# Patient Record
Sex: FEMALE | Race: WHITE | NOT HISPANIC OR LATINO | Employment: OTHER | URBAN - METROPOLITAN AREA
[De-identification: names, ages, dates, MRNs, and addresses within clinical notes are randomized per-mention and may not be internally consistent; named-entity substitution may affect disease eponyms.]

---

## 2017-05-03 ENCOUNTER — HOSPITAL ENCOUNTER (OUTPATIENT)
Dept: MAMMOGRAPHY | Facility: CLINIC | Age: 62
Discharge: HOME | End: 2017-05-03
Attending: INTERNAL MEDICINE | Admitting: INTERNAL MEDICINE
Payer: COMMERCIAL

## 2017-05-03 DIAGNOSIS — Z12.31 VISIT FOR SCREENING MAMMOGRAM: ICD-10-CM

## 2017-05-03 PROCEDURE — G0202 SCR MAMMO BI INCL CAD: HCPCS

## 2017-12-11 ENCOUNTER — HOSPITAL ENCOUNTER (OUTPATIENT)
Dept: NUTRITION | Facility: CLINIC | Age: 62
Discharge: HOME | End: 2017-12-11
Attending: INTERNAL MEDICINE | Admitting: INTERNAL MEDICINE
Payer: COMMERCIAL

## 2017-12-11 PROCEDURE — 97802 MEDICAL NUTRITION INDIV IN: CPT | Performed by: DIETITIAN, REGISTERED

## 2017-12-12 VITALS — WEIGHT: 119 LBS | BODY MASS INDEX: 18.64 KG/M2

## 2017-12-12 NOTE — PROGRESS NOTES
"OUTPATIENT NUTRITION ASSESSMENT  REASON FOR ASSESSMENT  Reyna Ramirez referred by Dr. Baldwin for MNT related to IBS.    Patient accompanied by self      ASSESSMENT   Nutrition History: - Information obtained from patient.  Patient with 2 bowel obstructions with one bowel resection since July.  Patient wants to know foods she can eat to prevent further bowel issues.   Patient received education while in the hospital ([facility]) and was given low fiber/low residue diet instruction.  Patient has started taking Miralax daily along with Colace which has improved her constipation.  Patient brought whey protein powder for dietitian to evaluate as states was given sample in the hospital and was encouraged by pharmacist to begin taking.  Patient has a history of constipation as previously would have bowel movement every 10-12 days.  Patient now having bowel movements every 2-3 days.  Patient states has history of eating disorder which she restricted and also binged.  Patient states has not binged for the past year.      Diet Recall:  Breakfast: 1 egg and English muffin   Lunch: 2 cups almond mild, cottage cheese, grapes, tomatoes, and 2 oz turkey   Dinner: Asian chicken salad with 1 slice bread  Snack: chocolate covered peanuts; mixed nuts  Beverages: 48 oz water; almond milk   Dining out: varies         Exercise: Patient has not been exercising consistently since her surgery.  Prior to surgery, patient was walking 3 miles per day around [facility].       PHYSICAL FINDINGS  Observed:  Muscle Wasting-arms  Subcutaneous fat loss-orbital region  Obtained from Chart/Interdisciplinary Team:  None noted    LABS  N/A    MEDICATIONS  N/A    ANTHROPOMETRICS   Height: 5'7\"  Weight: 119 lbs  BMI (kg/m2): 18.6 kg/m2  Weight Status:  Normal BMI  %IBW: 88%  Weight History: Patient states usual body weight 125-130 lbs.  Patient states after surgery her weight decreased to 113 lbs.    Wt Readings from Last 5 Encounters:   12/11/17 54 kg " (119 lb)   05/26/16 59 kg (130 lb)       ASSESSED NUTRITION NEEDS  Estimated Energy Needs: 6100-7694 kcals/day (30-35 Kcal/Kg)  Justification:  (repletion)  Estimated Protein Needs: 54-65 grams protein/day (1-1.2 g pro/Kg)  Justification:  (Repletion)  Estimated Fluid Needs: 2564-4929 mL/day (25-30 mL/kg)    ASSESSED MALNUTRITION STATUS  % Weight Loss:  Up to 7.5% in 3 months (non-severe malnutrition)  % Intake:  Decreased intake does not meet criteria for malnutrition   Subcutaneous Fat Loss:  Orbital region mild depletion  Loss of Muscle Mass:  Temporal region mild depletion  Fluid Retention:  None noted    Malnutrition Diagnosis:  Non-Severe malnutrition  In the Context of:  Chronic illness or disease    DIAGNOSIS   Nutrition Diagnosis:  Inadequate energy intake related to recent bowel surgery as evidenced by 9% weight loss in 5 months and restricted eating       INTERVENTIONS   Nutrition Prescription   Recommend 5-6 small meals per day with focus on fiber and protein needs    IMPLEMENTATION   Assessed learning needs and learning preference  Teaching Method(s) used: Booklet / Handout  Explanation  Vitamin and Mineral Supplements: per MD  Diet Education:  Provided education on IBS diet (FODMAP)  Nutrition Education (Content):   a)  Discussed portion sizes, consistent eating pattern and importance of eating enough calories to sustain energy.  Also reviewed science behind low FODMAP diet for IBS; outlined steps for the elimination diet, reviewed label reading; reviewed list of acceptable foods. Patient to begin eliminated added fibers to food before starting trial of FODMAP diet.  Low FODMAP diet guidelines include: (No wheat, fructans, lactose, or beans, limited fruits and vegetables d/t high fructose content, avoidance of HFCS and sugar alcohols.)  Reviewed protein supplement and since it was whey based protein supplement, suggest could mix with milk as quick meal option.  Suggest patient begin eliminating  additives to see if it improves her constipation.  Supported patient in her struggle with food   b)  Provided FODMAP diet  Nutrition Education (Application):   a)  Discussed current eating plans and recommended ways to increase food choices while limiting FODMAP foods.   b)  Patient verbalizes understanding of diet by stating will eat small meals throughout the day.  Anticipate fair-good compliance     GOALS  Eat 5-6 small meals per day  Trial of eliminating added fibers such as chicory and inulin     FOLLOW UP/MONITORING   Progress towards goals will be monitored and evaluated per protocol and Practice Guidelines  Patient to call for follow up appointment in 4-6 weeks   RD name and number provided     Time Spent with Patient  60 minutes    Marito Duval, RD, LD  [facility] Outpatient Dietitian  [phone] (office phone)

## 2018-05-04 ENCOUNTER — HOSPITAL ENCOUNTER (OUTPATIENT)
Dept: MAMMOGRAPHY | Facility: CLINIC | Age: 63
Discharge: HOME | End: 2018-05-04
Attending: INTERNAL MEDICINE | Admitting: INTERNAL MEDICINE
Payer: COMMERCIAL

## 2018-05-04 DIAGNOSIS — Z12.31 VISIT FOR SCREENING MAMMOGRAM: ICD-10-CM

## 2018-05-04 PROCEDURE — 77063 BREAST TOMOSYNTHESIS BI: CPT

## 2018-12-27 ENCOUNTER — ALLIED HEALTH/NURSE VISIT (OUTPATIENT)
Dept: NURSING | Facility: CLINIC | Age: 63
End: 2018-12-27
Payer: COMMERCIAL

## 2018-12-27 DIAGNOSIS — Z23 NEED FOR ZOSTER VACCINATION: Primary | ICD-10-CM

## 2018-12-27 PROCEDURE — 90750 HZV VACC RECOMBINANT IM: CPT

## 2018-12-27 PROCEDURE — 90471 IMMUNIZATION ADMIN: CPT

## 2019-06-18 ENCOUNTER — HOSPITAL ENCOUNTER (OUTPATIENT)
Dept: MAMMOGRAPHY | Facility: CLINIC | Age: 64
Discharge: HOME | End: 2019-06-18
Attending: INTERNAL MEDICINE | Admitting: INTERNAL MEDICINE
Payer: COMMERCIAL

## 2019-06-18 DIAGNOSIS — Z12.31 OTHER SCREENING MAMMOGRAM: ICD-10-CM

## 2019-06-18 PROCEDURE — 77063 BREAST TOMOSYNTHESIS BI: CPT

## 2020-10-20 ENCOUNTER — HOSPITAL ENCOUNTER (OUTPATIENT)
Dept: MAMMOGRAPHY | Facility: CLINIC | Age: 65
Discharge: HOME | End: 2020-10-20
Attending: INTERNAL MEDICINE | Admitting: INTERNAL MEDICINE
Payer: COMMERCIAL

## 2020-10-20 DIAGNOSIS — Z12.31 VISIT FOR SCREENING MAMMOGRAM: ICD-10-CM

## 2020-10-20 PROCEDURE — 77067 SCR MAMMO BI INCL CAD: CPT

## 2021-10-22 ENCOUNTER — HOSPITAL ENCOUNTER (OUTPATIENT)
Dept: MAMMOGRAPHY | Facility: CLINIC | Age: 66
Discharge: HOME | End: 2021-10-22
Attending: INTERNAL MEDICINE | Admitting: INTERNAL MEDICINE
Payer: COMMERCIAL

## 2021-10-22 DIAGNOSIS — Z12.31 VISIT FOR SCREENING MAMMOGRAM: ICD-10-CM

## 2021-10-22 PROCEDURE — 77063 BREAST TOMOSYNTHESIS BI: CPT

## 2022-08-19 ENCOUNTER — LAB REQUISITION (OUTPATIENT)
Dept: LAB | Facility: CLINIC | Age: 67
End: 2022-08-19

## 2022-08-19 DIAGNOSIS — Z01.419 ENCOUNTER FOR GYNECOLOGICAL EXAMINATION (GENERAL) (ROUTINE) WITHOUT ABNORMAL FINDINGS: ICD-10-CM

## 2022-08-19 PROCEDURE — G0123 SCREEN CERV/VAG THIN LAYER: HCPCS | Performed by: OBSTETRICS & GYNECOLOGY

## 2022-08-19 PROCEDURE — 87624 HPV HI-RISK TYP POOLED RSLT: CPT | Performed by: OBSTETRICS & GYNECOLOGY

## 2022-08-24 LAB
BKR LAB AP GYN ADEQUACY: NORMAL
BKR LAB AP GYN INTERPRETATION: NORMAL
BKR LAB AP HPV REFLEX: NORMAL
BKR LAB AP LMP: NORMAL
BKR LAB AP PREVIOUS ABNL DX: NORMAL
BKR LAB AP PREVIOUS ABNORMAL: NORMAL
PATH REPORT.COMMENTS IMP SPEC: NORMAL
PATH REPORT.COMMENTS IMP SPEC: NORMAL
PATH REPORT.RELEVANT HX SPEC: NORMAL

## 2022-08-25 LAB
HUMAN PAPILLOMA VIRUS 16 DNA: NEGATIVE
HUMAN PAPILLOMA VIRUS 18 DNA: NEGATIVE
HUMAN PAPILLOMA VIRUS FINAL DIAGNOSIS: NORMAL
HUMAN PAPILLOMA VIRUS OTHER HR: NEGATIVE

## 2022-08-26 ENCOUNTER — APPOINTMENT (OUTPATIENT)
Dept: URBAN - METROPOLITAN AREA CLINIC 257 | Age: 67
Setting detail: DERMATOLOGY
End: 2022-08-26

## 2022-08-26 DIAGNOSIS — L85.3 XEROSIS CUTIS: ICD-10-CM

## 2022-08-26 DIAGNOSIS — L82.1 OTHER SEBORRHEIC KERATOSIS: ICD-10-CM

## 2022-08-26 DIAGNOSIS — B07.8 OTHER VIRAL WARTS: ICD-10-CM

## 2022-08-26 DIAGNOSIS — L84 CORNS AND CALLOSITIES: ICD-10-CM

## 2022-08-26 PROCEDURE — OTHER MIPS QUALITY: OTHER

## 2022-08-26 PROCEDURE — 17110 DESTRUCT B9 LESION 1-14: CPT

## 2022-08-26 PROCEDURE — 99203 OFFICE O/P NEW LOW 30 MIN: CPT | Mod: 25

## 2022-08-26 PROCEDURE — OTHER LIQUID NITROGEN: OTHER

## 2022-08-26 PROCEDURE — OTHER REASSURANCE: OTHER

## 2022-08-26 PROCEDURE — OTHER PRESCRIPTION: OTHER

## 2022-08-26 PROCEDURE — OTHER COUNSELING: OTHER

## 2022-08-26 RX ORDER — IMIQUIMOD 12.5 MG/.25G
CREAM TOPICAL
Qty: 24 | Refills: 0 | Status: CANCELLED | COMMUNITY
Start: 2022-08-26

## 2022-08-26 ASSESSMENT — LOCATION SIMPLE DESCRIPTION DERM
LOCATION SIMPLE: RIGHT PRETIBIAL REGION
LOCATION SIMPLE: RIGHT INDEX FINGER
LOCATION SIMPLE: LEFT RING FINGER
LOCATION SIMPLE: LEFT MIDDLE FINGER
LOCATION SIMPLE: LEFT PLANTAR SURFACE

## 2022-08-26 ASSESSMENT — LOCATION ZONE DERM
LOCATION ZONE: FINGER
LOCATION ZONE: LEG
LOCATION ZONE: FEET

## 2022-08-26 ASSESSMENT — LOCATION DETAILED DESCRIPTION DERM
LOCATION DETAILED: RIGHT DISTAL PRETIBIAL REGION
LOCATION DETAILED: RIGHT DISTAL ULNAR PALMAR INDEX FINGER
LOCATION DETAILED: LEFT MID DORSAL MIDDLE FINGER
LOCATION DETAILED: LEFT PLANTAR FOREFOOT OVERLYING 3RD METATARSAL
LOCATION DETAILED: LEFT MID DORSAL RING FINGER

## 2022-08-26 NOTE — PROCEDURE: LIQUID NITROGEN
Spray Paint Text: The liquid nitrogen was applied to the skin utilizing a spray paint frosting technique.
Show Spray Paint Technique Variable?: Yes
Medical Necessity Clause: This procedure was medically necessary because the lesions that were treated were:
Detail Level: Zone
Post-Care Instructions: I reviewed with the patient in detail post-care instructions. Patient is to wear sunprotection, and avoid picking at any of the treated lesions. Pt may apply Vaseline to crusted or scabbing areas.
Add 52 Modifier (Optional): no
Medical Necessity Information: It is in your best interest to select a reason for this procedure from the list below. All of these items fulfill various CMS LCD requirements except the new and changing color options.
Consent: The patient's consent was obtained including but not limited to risks of crusting, scabbing, blistering, scarring, darker or lighter pigmentary change, recurrence, incomplete removal and infection.

## 2022-08-26 NOTE — PROCEDURE: COUNSELING
Detail Level: Generalized
Detail Level: Detailed
Patient Specific Counseling (Will Not Stick From Patient To Patient): Recommended emery board or nail file

## 2022-08-26 NOTE — HPI: WARTS (VERRUCA)
How Severe Are Your Warts?: mild
Is This A New Presentation, Or A Follow-Up?: Warts
Treatment Number (Optional): 6
Additional History: Pt states she sees another dermatologist and has had these treated 5 times (last treatment of July 14) and that she does not feel that they are going away and wanted a second opinion.

## 2022-08-29 ENCOUNTER — RX ONLY (RX ONLY)
Age: 67
End: 2022-08-29

## 2022-08-29 RX ORDER — IMIQUIMOD 12.5 MG/.25G
CREAM TOPICAL
Qty: 24 | Refills: 0 | Status: ERX

## 2022-11-01 NOTE — TELEPHONE ENCOUNTER
12/15/2022-Request for images faxed to [facility] @ 1035am    12/20/2022-[facility] Images now in PACS-MR @ 432am    FUTURE VISIT INFORMATION      FUTURE VISIT INFORMATION:    Date: 12/21/2022    Time: 930am    [place] [facility] [place]FORMATION:    Referring provider:  Self     Referring providers clinic:      Reason for visit/diagnosis  Headaches     RECORDS REQUESTED FROM:       Clinic name Comments Records Status Imaging Status   [facility]-8/23/2022    [facility]-12/14/2021 Care Everywhere Requested

## 2022-11-30 ENCOUNTER — HOSPITAL ENCOUNTER (OUTPATIENT)
Dept: MAMMOGRAPHY | Facility: CLINIC | Age: 67
Discharge: HOME | End: 2022-11-30
Attending: INTERNAL MEDICINE | Admitting: INTERNAL MEDICINE
Payer: COMMERCIAL

## 2022-11-30 DIAGNOSIS — Z12.31 VISIT FOR SCREENING MAMMOGRAM: ICD-10-CM

## 2022-11-30 PROCEDURE — 77067 SCR MAMMO BI INCL CAD: CPT

## 2022-12-14 ASSESSMENT — HEADACHE IMPACT TEST (HIT 6)
WHEN YOU HAVE A HEADACHE HOW OFTEN DO YOU WISH YOU COULD LIE DOWN: VERY OFTEN
HOW OFTEN HAVE YOU FELT FED UP OR IRRITATED BECAUSE OF YOUR HEADACHES: ALWAYS
HIT6 TOTAL SCORE: 72
HOW OFTEN DO HEADACHES LIMIT YOUR DAILY ACTIVITIES: VERY OFTEN
WHEN YOU HAVE HEADACHES HOW OFTEN IS THE PAIN SEVERE: VERY OFTEN
HOW OFTEN HAVE YOU FELT TOO TIRED TO WORK BECAUSE OF YOUR HEADACHES: ALWAYS
HOW OFTEN DID HEADACHS LIMIT CONCENTRATION ON WORK OR DAILY ACTIVITY: ALWAYS

## 2022-12-21 ENCOUNTER — VIRTUAL VISIT (OUTPATIENT)
Dept: NEUROLOGY | Facility: CLINIC | Age: 67
End: 2022-12-21
Payer: COMMERCIAL

## 2022-12-21 ENCOUNTER — PRE VISIT (OUTPATIENT)
Dept: NEUROLOGY | Facility: CLINIC | Age: 67
End: 2022-12-21

## 2022-12-21 DIAGNOSIS — G43.709 CHRONIC MIGRAINE WITHOUT AURA WITHOUT STATUS MIGRAINOSUS, NOT INTRACTABLE: Primary | ICD-10-CM

## 2022-12-21 PROCEDURE — 99205 OFFICE O/P NEW HI 60 MIN: CPT | Mod: GT | Performed by: PSYCHIATRY & NEUROLOGY

## 2022-12-21 RX ORDER — MAGNESIUM 200 MG
TABLET ORAL EVERY 24 HOURS
COMMUNITY
Start: 2022-11-29

## 2022-12-21 RX ORDER — CALCIUM CARBONATE/VITAMIN D3 600 MG-10
2 TABLET ORAL
COMMUNITY
Start: 2021-10-15

## 2022-12-21 RX ORDER — LIFITEGRAST 50 MG/ML
SOLUTION/ DROPS OPHTHALMIC
COMMUNITY

## 2022-12-21 RX ORDER — OFLOXACIN 3 MG/ML
SOLUTION/ DROPS OPHTHALMIC
COMMUNITY
Start: 2022-07-31

## 2022-12-21 RX ORDER — RIZATRIPTAN BENZOATE 10 MG/1
TABLET ORAL
COMMUNITY
Start: 2022-06-13 | End: 2022-12-21

## 2022-12-21 RX ORDER — RIZATRIPTAN BENZOATE 10 MG/1
10 TABLET ORAL
Qty: 18 TABLET | Refills: 11 | Status: SHIPPED | OUTPATIENT
Start: 2022-12-21 | End: 2022-12-21

## 2022-12-21 RX ORDER — ROSUVASTATIN CALCIUM 10 MG/1
10 TABLET, COATED ORAL DAILY
COMMUNITY
Start: 2022-11-23

## 2022-12-21 RX ORDER — THIAMINE HCL 100 MG
TABLET ORAL
COMMUNITY
Start: 2022-06-13

## 2022-12-21 RX ORDER — CYCLOBENZAPRINE HCL 10 MG
10 TABLET ORAL
COMMUNITY
Start: 2021-11-09

## 2022-12-21 RX ORDER — ZOLMITRIPTAN 5 MG/1
5 TABLET, FILM COATED ORAL
Qty: 18 TABLET | Refills: 11 | Status: SHIPPED | OUTPATIENT
Start: 2022-12-21 | End: 2023-06-28

## 2022-12-21 RX ORDER — MAGNESIUM OXIDE 400 MG/1
400 TABLET ORAL
COMMUNITY
Start: 2022-08-23

## 2022-12-21 RX ORDER — TRETINOIN 1 MG/G
CREAM TOPICAL
COMMUNITY
Start: 2022-06-13

## 2022-12-21 RX ORDER — POLYETHYLENE GLYCOL 3350 17 G/17G
POWDER, FOR SOLUTION ORAL
COMMUNITY
Start: 2022-10-08

## 2022-12-21 RX ORDER — ALENDRONATE SODIUM 70 MG/1
TABLET ORAL
COMMUNITY
Start: 2022-06-13

## 2022-12-21 RX ORDER — TRAZODONE HYDROCHLORIDE 50 MG/1
1 TABLET, FILM COATED ORAL AT BEDTIME
COMMUNITY
Start: 2022-11-15

## 2022-12-21 ASSESSMENT — MIGRAINE DISABILITY ASSESSMENT (MIDAS)
HOW OFTEN WERE SOCIAL ACTIVITIES MISSED DUE TO HEADACHES: 1
HOW MANY DAYS DID YOU NOT DO HOUSEWORK BECAUSE OF HEADACHES: 1
HOW MANY DAYS WAS YOUR PRODUCTIVITY CUT IN HALF BECAUSE OF HEADACHES: 1
HOW MANY DAYS DID YOU MISS WORK OR SCHOOL BECAUSE OF HEADACHES: 1
HOW MANY DAYS WAS HOUSEWORK PRODUCTIVITY CUT IN HALF DUE TO HEADACHES: 1
TOTAL SCORE: 5

## 2022-12-21 NOTE — LETTER
12/21/2022       RE: Reyna Ramirez  [address] [facility] [address]     Dear Colleague,    Thank you for referring your patient, Reyna Ramirez, to the [facility] [place] at [facility]. Please see a copy of my visit note below.    [facility]   Virtual Headache Neurology Consult  December 21, 2022     Reyna Ramirez MRN# [id number]   YOB: 1955 Age: 66 year old     Requesting physician: self-referred         Assessment and Recommendations:     Reyna Ramirez is a 66 year old female who presents for evaluation of headache.     Her current presentation is consistent with episodic migraine without aura, though previously her headaches were frequent enough to meet criteria for chronic migraine. She most likely has this on a genetic basis. Her virtual neurologic examination was normal. She recently had an MRI Brain 12/2/2022 which demonstrated a stable meningioma, which she monitors with her neurosurgeon.     We discussed a symptomatic treatment strategy for chronic migraine, with both acute and preventative treatment.  -For acute treatment of mild headache, she may continue Excedrin as needed, not to exceed more than 14 days/month to avoid medication overuse.  - For acute treatment of moderate to severe headache, she would like to retry zolmitriptan as this has been helpful in the past and she is not as pleased with rizatriptan recently. She will take zolmitriptan 5 mg tablet at onset of headache, with a repeat dose in 2 hours if needed.  - She does not have nausea with headache, so she does not currently desire an antiemetic.     -For headache prevention, we discussed continue with magnesium supplementation or adding a new medication. She is interested in adding a preventative.  - I recommend Emgality 120 mg subcutaneous injection every 28 days. We will obtain prior authorization for this.   - She has tried  "several preventatives in the past including propranolol, topiramate, citalopram, sertraline, Depakote, gabapentin, lamotrigine. She uses trazadone at night and given her age, would avoid amitriptyline.     Will follow-up in 6 months to monitor her progress.       I spent 60 minutes on patient care and documentation.    Patient seen with MD Racheal Long PA-C  Neurology    Physician Attestation   I, Dayanna Das MD, saw this patient and agree with the findings and plan of care as documented in the note.  I spent 60 minutes on record review, patient care, documentation on the date of visit.    Dayanna Das MD              Chief Complaint:     Chief Complaint   Patient presents with     Video Visit     Np headache           History is obtained from the patient and medical record.  Patient was seen via a virtual visit in their home.      Reyna Ramirez is a 66 year old female who has had headaches since age 10. She has been diagnosed with migraine headaches.     She has previously followed with Dr. Hood at [facility] and Dr. Hassan.     Her headaches start as pain at the left posterior neck and pain radiates to the left temple. Pain can move to there right side. It can be throbbing, pressure, or stabbing pain. She denies any photophobia, phonophobia, visual aura, autonomic or positional features. She does not get nauseous with headache.    She states she can also have \"tension\" headaches which are a pressure located at her forehead. She takes Excedrin for these which is generally helpful. She is currently uses Excedrin 2 times per week.    Headaches have been stable. She reports 8 headaches per month. Years ago, she was having > 15 headaches per month.     She is currently using Maxalt for acute treatment of headache. She is finding this is not as effective as it used to be. Previously, she would have relief in 45 minutes, but lately finds her migraines return and " "can persist for 4-5 days despite using second dose of Maxalt.     In the past, she has tried sumatriptan tablet and injection which were not effective and she also did not tolerate side effect of flushing. She has also used zolmitriptan in the past. Possibly this was stopped due to less effectiveness, but she had previously had good response to zolmitriptan.    For headache prevention, she is using magnesium supplements with uncertain benefit.     From office visit note with Dr. Margarette Hassan 11/12/2021:  \"Past treatment trials include:  Topamax-100mg marked dec in HA freq to 1/mo but slowly wore off and dose inc to 300mg '05. Retry '13 to 150mg with transient help then wore off.  Depakote 1000mg dec HA to 2/mo '99 retry '11 500mg no help. Imitrex short duration. Zomig lasts longer.   Zoloft no help.  Inderal 120mg helped migraines for awhile but then wore off '08.  Celexa helped anxiety/HA '09. Robaxin no help. Flexeril minor help protracted HA.  Neurontin 100mg '11 SE.  Lamictal 75mg bid with dec in HA and anxiety '15- HA 4-6d/mo but effect waned and d/c in 2021.   Acupuncture trial helped '16  ABORTIVES- Imitrex SE. Zomig stops migraine with early tx but nausea. Maxalt works well without nausea '20  2)NP- ongoing tension drives NP/HAs and scalenes main area that is tight. cyclobenzaprine helps.  3)Generalized anxiety- Neuropsych testing Noran '08 nl per pt and no ADD. She did better on Celexa winter '09 but stopped it on her own. Panic attack in '08 and in '09 with control on Celexa for awhile then hospitalized in 12/10 at [facility]. She is off Adderal, Zoloft and wellbutrin in '15. She was calmer on Lamictal 75mg bid and she has stopped this on her own twice now\"      She also has a stable meningioma, she monitors this with neurosurgery.  She also has a history of anxiety, depression, obsessive-compulsive disorder.               Past Medical History:   No past medical history on file.   Generalized anxiety, " depression, obsessive-compulsive disorder, meningioma          Past Surgical History:     Past Surgical History:   Procedure Laterality Date     GYN SURGERY       STRIP VEIN               Social History:     Social History     Socioeconomic History     Marital status:      Spouse name: Not on file     Number of children: Not on file     Years of education: Not on file     Highest education level: Not on file   Occupational History     Not on file   Tobacco Use     Smoking status: Never     Smokeless tobacco: Never   Substance and Sexual Activity     Alcohol use: Yes     Drug use: No     Sexual activity: Yes     Partners: Male   Other Topics Concern     Parent/sibling w/ CABG, MI or angioplasty before 65F 55M? Not Asked   Social History Narrative     Not on file     Social Determinants of Health     Financial Resource Strain: Not on file   Food Insecurity: Not on file   Transportation Needs: Not on file   Physical Activity: Not on file   Stress: Not on file   Social Connections: Not on file   Intimate Partner Violence: Not on file   Housing Stability: Not on file             Family History:   Mother and daughters have headache.    Family History   Problem Relation Age of Onset     Cancer Mother      Cancer Father      Coronary Artery Disease Maternal Grandfather      Cancer Paternal Grandmother      Blood Disease Brother      Cancer Daughter      Coronary Artery Disease Daughter              Allergies:    No Known Allergies          Medications:     Current Outpatient Medications:      alendronate (FOSAMAX) 70 MG tablet, alendronate 70 mg tablet, Disp: , Rfl:      Calcium Carb-Ergocalciferol 500-200 MG-UNIT TABS, , Disp: , Rfl:      calcium carbonate-vitamin D (CALTRATE) 600-10 MG-MCG per tablet, Take 2 tablets by mouth, Disp: , Rfl:      Calcium Citrate-Vitamin D3 315-6.25 MG-MCG TABS, Takes 600mg two times a day, Disp: , Rfl:      cyclobenzaprine (FLEXERIL) 10 MG tablet, Take 10 mg by mouth, Disp: , Rfl:       galcanezumab-gnlm (EMGALITY) 120 MG/ML injection, Inject 2 mLs (240 mg) Subcutaneous once for 1 dose, Disp: 2 mL, Rfl: 0     galcanezumab-gnlm (EMGALITY) 120 MG/ML injection, Inject 1 mL (120 mg) Subcutaneous every 28 days, Disp: 1 mL, Rfl: 11     lifitegrast (XIIDRA) 5 % opthalmic solution, Xiidra 5 % eye drops in a dropperette  INSTILL 1 DROP IN BOTH EYES TWICE DAILY, Disp: , Rfl:      magnesium 200 MG TABS, Take by mouth every 24 hours, Disp: , Rfl:      magnesium oxide (MAG-OX) 400 MG tablet, Take 400 mg by mouth, Disp: , Rfl:      ofloxacin (OCUFLOX) 0.3 % ophthalmic solution, INSTILL 1 DROP INTO AFFECTED EYE(S) 4 TIMES A DAY, Disp: , Rfl:      polyethylene glycol (MIRALAX) 17 GM/Dose powder, , Disp: , Rfl:      rizatriptan (MAXALT) 10 MG tablet, Take 1 tablet (10 mg) by mouth at onset of headache for migraine (repeat in 2 hours if needed), Disp: 18 tablet, Rfl: 11     rosuvastatin (CRESTOR) 10 MG tablet, Take 10 mg by mouth daily, Disp: , Rfl:      traZODone (DESYREL) 50 MG tablet, Take 1 tablet by mouth At Bedtime, Disp: , Rfl:      tretinoin (RETIN-A) 0.1 % external cream, , Disp: , Rfl:      COMPRESSION STOCKINGS, 1 each daily (Patient not taking: Reported on 12/21/2022), Disp: 2 Units, Rfl: 4          Physical Exam:   There were no vitals taken for this visit.   Physical Exam:   General: NAD  Neurologic:   Mental Status Exam: Alert, awake and oriented to situation. No dysarthria. Speech of normal fluency.   Cranial Nerves: Pupils equal, EOMs intact, no nystagmus, facial movements symmetric, hearing intact to conversation, tongue midline and fully mobile. No tongue atrophy or fasciculations.    Motor: No drift in upper extremities. Able to stand from a seated position without use of arms. No tremors or abnormal movements noted.   Coordination: With arms outstretched, able to touch nose using index finger accurately bilaterally.  Normal finger tapping bilaterally.     Gait: Normal stance and casual gait.     Neck: Normal range of motion with lateral head movements and neck flexion.  Eyes: No conjunctival injection, no scleral icterus.           Data:     MRI brain (12/2/2022):   Comparison: 12/14/2021   Findings: No evidence of diffusion restriction to suggest acute ischemia. No midline shift. Stable scattered foci of T2 prolongation in the supratentorial subcortical white matter. No hydrocephalus. No suspicious extra-axial collection or acute intracranial hemorrhage. Stable 1.8 x 0.5 x 1.6 cm (AP x TR x CC) extra-axial dural-based enhancing lesion along the interhemispheric fissure consistent with meningioma. No significant mass effect on adjacent structures. No associated parenchymal edema. Expected intracranial vascular flow voids are preserved. The orbits are unremarkable. The paranasal sinuses are clear. The mastoid air cells are clear.   Impression:   1. Stable meningioma along the interhemispheric fissure. No significant mass effect on adjacent structures. No associated parenchymal edema. No new lesions.   2. Stable mild presumed chronic microangiopathic white matter changes.           Again, thank you for allowing me to participate in the care of your patient.      Sincerely,    Dayanna Das MD

## 2022-12-22 ENCOUNTER — TELEPHONE (OUTPATIENT)
Dept: NEUROLOGY | Facility: CLINIC | Age: 67
End: 2022-12-22

## 2022-12-22 NOTE — TELEPHONE ENCOUNTER
PA Initiation    Medication: galcanezumab-gnlm (EMGALITY) 120 MG/ML injection   Insurance Company: [employer] - Phone [phone] Fax [phone]  Pharmacy Filling the Rx: [facility] - [address]  Filling Pharmacy Phone: [phone]  Filling Pharmacy Fax: [phone]  Start Date: 12/22/2022

## 2022-12-22 NOTE — TELEPHONE ENCOUNTER
Prior Authorization Retail Medication Request    Medication/Dose: galcanezumab-gnlm (EMGALITY) 120     Loading dose is 240mg and monthly maintenance dose is 120mg  ICD code (if different than what is on RX):    Previously Tried and Failed:  Past treatment trials include:Sumatriptan tablet and injection - had flushing Topamax-100mg marked dec in HA freq to 1/mo but slowly wore off and dose inc to 300mg '05. Retry '13 to 150mg with transient help then wore off.  Depakote 1000mg dec HA to 2/mo '99 retry '11 500mg no help. Imitrex short duration. Zomig lasts longer.   Zoloft no help.  Inderal 120mg helped migraines for awhile but then wore off '08.  Celexa helped anxiety/HA '09. Robaxin no help. Flexeril minor help protracted HA.  Neurontin 100mg '11 SE.  Lamictal 75mg bid with dec in HA and anxiety '15- HA 4-6d/mo but effect waned and d/c in 2021.   Acupuncture trial helped '16     Rationale: migraine     Insurance Name:  [employer]   Insurance ID: [id number]        Pharmacy Information (if different than what is on RX)  Name:    Phone:

## 2022-12-22 NOTE — TELEPHONE ENCOUNTER
Prior Authorization Approval    Authorization Effective Date: 11/22/2022  Authorization Expiration Date: 12/22/2023  Medication: galcanezumab-gnlm (EMGALITY) 120 MG/ML injection--APPROVED  Approved Dose/Quantity:   Reference #:     Insurance Company: [employer] - Phone [phone] Fax [phone]  Expected CoPay:       CoPay Card Available:      Foundation Assistance Needed:    Which Pharmacy is filling the prescription (Not needed for infusion/clinic administered): [facility] - [address]  Pharmacy Notified: Yes  Patient Notified: Yes **Instructed pharmacy to notify patient when script is ready to /ship.**

## 2023-01-12 ENCOUNTER — TRANSFERRED RECORDS (OUTPATIENT)
Dept: HEALTH INFORMATION MANAGEMENT | Facility: CLINIC | Age: 68
End: 2023-01-12

## 2023-05-03 ENCOUNTER — LAB REQUISITION (OUTPATIENT)
Dept: LAB | Facility: CLINIC | Age: 68
End: 2023-05-03
Payer: COMMERCIAL

## 2023-05-03 DIAGNOSIS — Z01.419 ENCOUNTER FOR GYNECOLOGICAL EXAMINATION (GENERAL) (ROUTINE) WITHOUT ABNORMAL FINDINGS: ICD-10-CM

## 2023-05-03 PROCEDURE — G0145 SCR C/V CYTO,THINLAYER,RESCR: HCPCS | Mod: ORL | Performed by: OBSTETRICS & GYNECOLOGY

## 2023-06-24 ASSESSMENT — HEADACHE IMPACT TEST (HIT 6)
HOW OFTEN DO HEADACHES LIMIT YOUR DAILY ACTIVITIES: SOMETIMES
WHEN YOU HAVE HEADACHES HOW OFTEN IS THE PAIN SEVERE: SOMETIMES
WHEN YOU HAVE A HEADACHE HOW OFTEN DO YOU WISH YOU COULD LIE DOWN: VERY OFTEN
HOW OFTEN HAVE YOU FELT FED UP OR IRRITATED BECAUSE OF YOUR HEADACHES: SOMETIMES
HIT6 TOTAL SCORE: 61
HOW OFTEN HAVE YOU FELT TOO TIRED TO WORK BECAUSE OF YOUR HEADACHES: SOMETIMES
HOW OFTEN DID HEADACHS LIMIT CONCENTRATION ON WORK OR DAILY ACTIVITY: SOMETIMES

## 2023-06-24 ASSESSMENT — MIGRAINE DISABILITY ASSESSMENT (MIDAS)
TOTAL SCORE: 9
HOW MANY DAYS DID YOU NOT DO HOUSEWORK BECAUSE OF HEADACHES: 2
HOW MANY DAYS WAS HOUSEWORK PRODUCTIVITY CUT IN HALF DUE TO HEADACHES: 3
HOW MANY DAYS IN THE PAST 3 MONTHS HAVE YOU HAD A HEADACHE: 10
HOW MANY DAYS DID YOU MISS WORK OR SCHOOL BECAUSE OF HEADACHES: 0
HOW OFTEN WERE SOCIAL ACTIVITIES MISSED DUE TO HEADACHES: 2
HOW MANY DAYS WAS YOUR PRODUCTIVITY CUT IN HALF BECAUSE OF HEADACHES: 2
ON A SCALE FROM 0-10 ON AVERAGE HOW PAINFUL WERE HEADACHES: 5

## 2023-06-28 ENCOUNTER — VIRTUAL VISIT (OUTPATIENT)
Dept: NEUROLOGY | Facility: CLINIC | Age: 68
End: 2023-06-28
Payer: COMMERCIAL

## 2023-06-28 DIAGNOSIS — G43.709 CHRONIC MIGRAINE WITHOUT AURA WITHOUT STATUS MIGRAINOSUS, NOT INTRACTABLE: ICD-10-CM

## 2023-06-28 PROCEDURE — 99213 OFFICE O/P EST LOW 20 MIN: CPT | Mod: VID | Performed by: PSYCHIATRY & NEUROLOGY

## 2023-06-28 RX ORDER — DOCUSATE SODIUM 100 MG/1
CAPSULE, LIQUID FILLED ORAL
COMMUNITY

## 2023-06-28 RX ORDER — ZOLMITRIPTAN 5 MG/1
5 TABLET, FILM COATED ORAL
Qty: 18 TABLET | Refills: 11 | Status: SHIPPED | OUTPATIENT
Start: 2023-06-28 | End: 2024-03-08

## 2023-06-28 RX ORDER — POLYETHYLENE GLYCOL 3350 17 G/17G
1 POWDER, FOR SOLUTION ORAL 2 TIMES DAILY
COMMUNITY

## 2023-06-28 NOTE — LETTER
6/28/2023       RE: Reyna Ramirez  [address] [address]       Dear Colleague,    Thank you for referring your patient, Reyna Ramirez, to the [facility] NEUROLOGY CLIN[facility] [place] at [facility]. Please see a copy of my visit note below.    [facility]    Headache Neurology Progress Note  June 28, 2023    Subjective:    Reyna Ramirez returns for follow up of episodic migraine.    She's had about 6/30 headache days per month since I last saw her. Not all of these were migraine attacks. No change in quality.      She received her Emgality prescription, but did some traveling and didn't start this.    She tried zolmitriptan, which is working better for her than rizatriptan.    Objective:    Vitals: There were no vitals taken for this visit.  General: Cooperative, NAD  Neurologic:  Mental Status: Fully alert, attentive and oriented. Speech clear and fluent.   Cranial Nerves: Facial movements symmetric.   Motor: No abnormal movements.      Pertinent Investigations:    MRI brain (12/2/2022):  1. Stable meningioma along the interhemispheric fissure. No significant mass effect on adjacent structures. No associated parenchymal edema. No new lesions.   2. Stable mild presumed chronic microangiopathic white matter changes.     Assessment/Plan:   Reyna Ramirez is a 67 year old who returns for follow up of episodic migraine without aura, though previously her headaches were frequent enough to meet criteria for chronic migraine. Her virtual neurologic examination was normal. She had an MRI Brain 12/2/2022 which demonstrated a stable meningioma, which she monitors with her neurosurgeon.     We discussed a symptomatic treatment strategy for migraine.  -For acute treatment of mild headache, she may continue Excedrin as needed, not to exceed more than 14 days/month to avoid medication overuse.  - For acute treatment of  moderate to severe headache, I recommend zolmitriptan. She will take zolmitriptan 5 mg tablet at onset of headache, with a repeat dose in 2 hours if needed.  - She does not have nausea with headache, so she does not currently desire an antiemetic.     -For headache prevention, she would not choose to take a treatment now.   - She has tried several preventatives in the past including propranolol, topiramate, citalopram, sertraline, Depakote, gabapentin, lamotrigine. She uses trazadone at night and given her age, would avoid amitriptyline.      Follow up with neurosurgery regarding monitoring for meningioma. Will follow-up in 12 months to monitor her progress.         Again, thank you for allowing me to participate in the care of your patient.      Sincerely,    Dayanna Das MD

## 2023-06-28 NOTE — PROGRESS NOTES
Virtual Visit Details    Type of service:  Video Visit     Originating Location (pt. Location): Home  Distant Location (provider location):  Off-site  Platform used for Video Visit: [facility]    Headache Neurology Progress Note  June 28, 2023    Subjective:    Reyna Ramirez returns for follow up of episodic migraine.    She's had about 6/30 headache days per month since I last saw her. Not all of these were migraine attacks. No change in quality.      She received her Emgality prescription, but did some traveling and didn't start this.    She tried zolmitriptan, which is working better for her than rizatriptan.    Objective:    Vitals: There were no vitals taken for this visit.  General: Cooperative, NAD  Neurologic:  Mental Status: Fully alert, attentive and oriented. Speech clear and fluent.   Cranial Nerves: Facial movements symmetric.   Motor: No abnormal movements.      Pertinent Investigations:    MRI brain (12/2/2022):  1. Stable meningioma along the interhemispheric fissure. No significant mass effect on adjacent structures. No associated parenchymal edema. No new lesions.   2. Stable mild presumed chronic microangiopathic white matter changes.     Assessment/Plan:   Reyna Ramirez is a 67 year old who returns for follow up of episodic migraine without aura, though previously her headaches were frequent enough to meet criteria for chronic migraine. Her virtual neurologic examination was normal. She had an MRI Brain 12/2/2022 which demonstrated a stable meningioma, which she monitors with her neurosurgeon.     We discussed a symptomatic treatment strategy for migraine.  -For acute treatment of mild headache, she may continue Excedrin as needed, not to exceed more than 14 days/month to avoid medication overuse.  - For acute treatment of moderate to severe headache, I recommend zolmitriptan. She will take zolmitriptan 5 mg tablet at onset of headache, with a repeat  dose in 2 hours if needed.  - She does not have nausea with headache, so she does not currently desire an antiemetic.     -For headache prevention, she would not choose to take a treatment now.   - She has tried several preventatives in the past including propranolol, topiramate, citalopram, sertraline, Depakote, gabapentin, lamotrigine. She uses trazadone at night and given her age, would avoid amitriptyline.      Follow up with neurosurgery regarding monitoring for meningioma. Will follow-up in 12 months to monitor her progress.     Dayanna Das MD  Neurology

## 2023-06-28 NOTE — NURSING NOTE
Is the patient currently in the state of [place]? YES    Visit mode:VIDEO    If the visit is dropped, the patient can be reconnected by: TELEPHONE VISIT: Phone number: [phone]    Will anyone else be joining the visit? NO      How would you like to obtain your AVS? MyChart    Are changes needed to the allergy or medication list? NO    Reason for visit: Video Visit (Headache follow-up )

## 2023-07-10 ENCOUNTER — TELEPHONE (OUTPATIENT)
Dept: NEUROLOGY | Facility: CLINIC | Age: 68
End: 2023-07-10
Payer: COMMERCIAL

## 2023-07-10 NOTE — TELEPHONE ENCOUNTER
Left Voicemail (1st Attempt) and Sent Myckostat (1st Attempt) for the patient to call back and schedule the following:    Appointment type: follow up  Provider: Dr. Das  Return date: 1 year (June 2024)  Specialty phone number: [phone]  Additional appointment(s) needed: N/A  Additonal Notes:   LVM, and sent Varinder De Jesus on 7/10/2023 at 3:34 PM

## 2023-09-07 ENCOUNTER — TRANSFERRED RECORDS (OUTPATIENT)
Dept: HEALTH INFORMATION MANAGEMENT | Facility: CLINIC | Age: 68
End: 2023-09-07
Payer: COMMERCIAL

## 2023-09-07 ENCOUNTER — MEDICAL CORRESPONDENCE (OUTPATIENT)
Dept: HEALTH INFORMATION MANAGEMENT | Facility: CLINIC | Age: 68
End: 2023-09-07
Payer: COMMERCIAL

## 2023-09-07 LAB — INR (EXTERNAL): 1 (ref 0.9–1.2)

## 2023-09-27 ENCOUNTER — TRANSFERRED RECORDS (OUTPATIENT)
Dept: HEALTH INFORMATION MANAGEMENT | Facility: CLINIC | Age: 68
End: 2023-09-27

## 2023-10-12 ENCOUNTER — TELEPHONE (OUTPATIENT)
Dept: MEDSURG UNIT | Facility: CLINIC | Age: 68
End: 2023-10-12
Payer: COMMERCIAL

## 2023-10-13 RX ORDER — LIDOCAINE 40 MG/G
CREAM TOPICAL
Status: CANCELLED | OUTPATIENT
Start: 2023-10-13

## 2023-10-19 ENCOUNTER — TELEPHONE (OUTPATIENT)
Dept: MEDSURG UNIT | Facility: CLINIC | Age: 68
End: 2023-10-19
Payer: COMMERCIAL

## 2023-10-20 ENCOUNTER — HOSPITAL ENCOUNTER (OUTPATIENT)
Dept: ULTRASOUND IMAGING | Facility: CLINIC | Age: 68
Discharge: HOME | End: 2023-10-20
Attending: INTERNAL MEDICINE | Admitting: RADIOLOGY
Payer: COMMERCIAL

## 2023-10-20 ENCOUNTER — HOSPITAL ENCOUNTER (OUTPATIENT)
Facility: CLINIC | Age: 68
Discharge: HOME | End: 2023-10-20
Admitting: RADIOLOGY
Payer: COMMERCIAL

## 2023-10-20 VITALS
RESPIRATION RATE: 16 BRPM | TEMPERATURE: 97 F | OXYGEN SATURATION: 98 % | HEART RATE: 53 BPM | HEIGHT: 67 IN | WEIGHT: 125 LBS | SYSTOLIC BLOOD PRESSURE: 126 MMHG | DIASTOLIC BLOOD PRESSURE: 57 MMHG | BODY MASS INDEX: 19.62 KG/M2

## 2023-10-20 DIAGNOSIS — R79.89 ABNORMAL LIVER FUNCTION TEST: ICD-10-CM

## 2023-10-20 LAB
INR PPP: 0.95 (ref 0.85–1.15)
PLATELET # BLD AUTO: 221 10E3/UL (ref 150–450)

## 2023-10-20 PROCEDURE — 999N000154 HC STATISTIC RADIOLOGY XRAY, US, CT, MAR, NM

## 2023-10-20 PROCEDURE — 36415 COLL VENOUS BLD VENIPUNCTURE: CPT | Performed by: PHYSICIAN ASSISTANT

## 2023-10-20 PROCEDURE — 250N000009 HC RX 250: Performed by: RADIOLOGY

## 2023-10-20 PROCEDURE — 85610 PROTHROMBIN TIME: CPT | Performed by: PHYSICIAN ASSISTANT

## 2023-10-20 PROCEDURE — 99152 MOD SED SAME PHYS/QHP 5/>YRS: CPT

## 2023-10-20 PROCEDURE — 88307 TISSUE EXAM BY PATHOLOGIST: CPT | Mod: 26 | Performed by: PATHOLOGY

## 2023-10-20 PROCEDURE — 85049 AUTOMATED PLATELET COUNT: CPT | Performed by: PHYSICIAN ASSISTANT

## 2023-10-20 PROCEDURE — 88313 SPECIAL STAINS GROUP 2: CPT | Mod: TC,59 | Performed by: INTERNAL MEDICINE

## 2023-10-20 PROCEDURE — 272N000431 US BIOPSY LIVER

## 2023-10-20 PROCEDURE — 250N000011 HC RX IP 250 OP 636: Performed by: RADIOLOGY

## 2023-10-20 PROCEDURE — 88313 SPECIAL STAINS GROUP 2: CPT | Mod: 26 | Performed by: PATHOLOGY

## 2023-10-20 RX ORDER — NALOXONE HYDROCHLORIDE 0.4 MG/ML
0.4 INJECTION, SOLUTION INTRAMUSCULAR; INTRAVENOUS; SUBCUTANEOUS
Status: DISCONTINUED | OUTPATIENT
Start: 2023-10-20 | End: 2023-10-20 | Stop reason: HOSPADM

## 2023-10-20 RX ORDER — NALOXONE HYDROCHLORIDE 0.4 MG/ML
0.2 INJECTION, SOLUTION INTRAMUSCULAR; INTRAVENOUS; SUBCUTANEOUS
Status: DISCONTINUED | OUTPATIENT
Start: 2023-10-20 | End: 2023-10-20 | Stop reason: HOSPADM

## 2023-10-20 RX ORDER — FENTANYL CITRATE 50 UG/ML
25-50 INJECTION, SOLUTION INTRAMUSCULAR; INTRAVENOUS EVERY 5 MIN PRN
Status: DISCONTINUED | OUTPATIENT
Start: 2023-10-20 | End: 2023-10-20 | Stop reason: HOSPADM

## 2023-10-20 RX ORDER — FLUMAZENIL 0.1 MG/ML
0.2 INJECTION, SOLUTION INTRAVENOUS
Status: DISCONTINUED | OUTPATIENT
Start: 2023-10-20 | End: 2023-10-20 | Stop reason: HOSPADM

## 2023-10-20 RX ORDER — LIDOCAINE 40 MG/G
CREAM TOPICAL
Status: DISCONTINUED | OUTPATIENT
Start: 2023-10-20 | End: 2023-10-20 | Stop reason: HOSPADM

## 2023-10-20 RX ORDER — LIDOCAINE HYDROCHLORIDE 10 MG/ML
10 INJECTION, SOLUTION EPIDURAL; INFILTRATION; INTRACAUDAL; PERINEURAL ONCE
Status: COMPLETED | OUTPATIENT
Start: 2023-10-20 | End: 2023-10-20

## 2023-10-20 RX ADMIN — MIDAZOLAM 0.5 MG: 1 INJECTION INTRAMUSCULAR; INTRAVENOUS at 10:57

## 2023-10-20 RX ADMIN — FENTANYL CITRATE 25 MCG: 50 INJECTION, SOLUTION INTRAMUSCULAR; INTRAVENOUS at 10:59

## 2023-10-20 RX ADMIN — LIDOCAINE HYDROCHLORIDE 10 ML: 10 INJECTION, SOLUTION EPIDURAL; INFILTRATION; INTRACAUDAL; PERINEURAL at 10:57

## 2023-10-20 ASSESSMENT — ACTIVITIES OF DAILY LIVING (ADL)
ADLS_ACUITY_SCORE: 35

## 2023-10-20 NOTE — PROCEDURES
[facility]    Procedure: IR Procedure Note    Date/Time: 10/20/2023 11:15 AM    Performed by: Vickie Landa MD  Authorized by: Vickie Landa MD      UNIVERSAL PROTOCOL   Site Marked: NA  Prior Images Obtained and Reviewed:  Yes  Required items: Required blood products, implants, devices and special equipment available    Patient identity confirmed:  Verbally with patient, arm band, provided demographic data and hospital-assigned identification number  Patient was reevaluated immediately before administering moderate or deep sedation or anesthesia  Confirmation Checklist:  Patient's identity using two indicators, relevant allergies, procedure was appropriate and matched the consent or emergent situation and correct equipment/implants were available  Time out: Immediately prior to the procedure a time out was called    Universal Protocol: the Joint Commission Universal Protocol was followed    Preparation: Patient was prepped and draped in usual sterile fashion       ANESTHESIA    Anesthesia: Local infiltration  Local Anesthetic:  Lidocaine 1% without epinephrine      SEDATION  Patient Sedated: Yes    Sedation Type:  Moderate (conscious) sedation  Sedation:  Fentanyl and midazolam  Vital signs: Vital signs monitored during sedation    See dictated procedure note for full details.  Findings: US guided native liver parenchymal biopsy    Specimens: none    Complications: None    Condition: Stable      PROCEDURE    Patient Tolerance:  Patient tolerated the procedure well with no immediate complications  Length of time physician/provider present for 1:1 monitoring during sedation: 15

## 2023-10-20 NOTE — PROGRESS NOTES
Care Suites Discharge Nursing Note    Patient Information  Name: Reyna Ramirez  Age: 67 year old    Discharge Education:  Discharge instructions reviewed: Yes  Additional education/resources provided: NA  Patient/patient representative verbalizes understanding: Yes  Patient discharging on new medications: No  Medication education completed: N/A    Discharge Plans:   Discharge location: home  Discharge ride contacted: Yes  Approximate discharge time: 1415    Discharge Criteria:  Discharge criteria met and vital signs stable: Yes    Patient Belongs:  Patient belongings returned to patient: Yes    Jovan Smith RN

## 2023-10-20 NOTE — PRE-PROCEDURE
GENERAL PRE-PROCEDURE:     Written consent obtained?: Yes    Risks and benefits: Risks, benefits and alternatives were discussed    Consent given by:  Patient  Patient states understanding of procedure being performed: Yes    Patient's understanding of procedure matches consent: Yes    Procedure consent matches procedure scheduled: Yes    Expected level of sedation:  Moderate  Appropriately NPO:  Yes  ASA Class:  1  Mallampati  :  Grade 1- soft palate, uvula, tonsillar pillars, and posterior pharyngeal wall visible  Lungs:  Lungs clear with good breath sounds bilaterally  Heart:  Normal heart sounds and rate  History & Physical reviewed:  History and physical reviewed and no updates needed  Statement of review:  I have reviewed the lab findings, diagnostic data, medications, and the plan for sedation

## 2023-10-20 NOTE — PROGRESS NOTES
Care Suites Post Procedure Note    Patient Information  Name: Reyna Ramirez  Age: 67 year old    Post Procedure  Time patient returned to Care Suites: 1130  Concerns/abnormal assessment: No immediate  If abnormal assessment, provider notified: N/A  Plan/Other: Continue post procedure plan of care.  Anticipate discharge after 1315 when all discharge criteria is met.    Jovan Smith RN

## 2023-10-20 NOTE — SEDATION DOCUMENTATION
Pt received 0.5 mg IV versed and 25 mcg IV Fentanyl for procedure, tolerated well. VSS, Returned to care suites #11, pt admits to start of migraine, took oral meds for this. Tolerating PO. Dressing remains CDI. Report to Rad FLANAGAN

## 2023-10-20 NOTE — DISCHARGE INSTRUCTIONS
Liver Biopsy Discharge Instructions     After you go home:    You may resume your normal diet  Have an adult stay with you for 6 hours if you received sedation       For 24 hours - due to the sedation you received:  Relax and take it easy  Do NOT make any important or legal decisions  Do NOT drive or operate machines at home or at work  Do NOT drink alcohol    Care of Puncture Site:    For the first 48 hrs, check your puncture site every couple hours while you are awake   You may remove/change the bandaid tomorrow  You may shower tomorrow  No tub baths, whirlpools or swimming until your puncture site has fully healed    Activity     You may go back to normal activity in 24 hours   Wait 48 hours before lifting, straining, exercise or other strenuous activity    Medicines:    You may resume all medications  Resume your Warfarin/Coumadin at your regular dose today. Follow up with your provider to have your INR rechecked  Resume your Platelet Inhibitors and Aspirin tomorrow at your regular dose  For minor pain, you may take Acetaminophen (Tylenol) or Ibuprofen (Advil)            Call the provider who ordered this test if:    Increased pain or a large or growing hard lump around the site  Blood or fluid is draining from the site  The site is red, swollen, hot or tender  Chills or a fever greater than 101 F (38 C)  Pain that is getting worse  Any questions or concerns    Call  911 or go to the Emergency Room if:    Severe pain or trouble breathing  Bleeding that you cannot control        If you have questions call:          [facility] @ [phone]

## 2023-10-24 LAB
PATH REPORT.COMMENTS IMP SPEC: NORMAL
PATH REPORT.FINAL DX SPEC: NORMAL
PATH REPORT.GROSS SPEC: NORMAL
PATH REPORT.MICROSCOPIC SPEC OTHER STN: NORMAL
PATH REPORT.RELEVANT HX SPEC: NORMAL
PHOTO IMAGE: NORMAL

## 2023-12-21 ENCOUNTER — HOSPITAL ENCOUNTER (OUTPATIENT)
Dept: MAMMOGRAPHY | Facility: CLINIC | Age: 68
Discharge: HOME | End: 2023-12-21
Attending: INTERNAL MEDICINE | Admitting: INTERNAL MEDICINE
Payer: COMMERCIAL

## 2023-12-21 DIAGNOSIS — Z12.31 VISIT FOR SCREENING MAMMOGRAM: ICD-10-CM

## 2023-12-21 PROCEDURE — 77067 SCR MAMMO BI INCL CAD: CPT

## 2024-03-08 DIAGNOSIS — G43.709 CHRONIC MIGRAINE WITHOUT AURA WITHOUT STATUS MIGRAINOSUS, NOT INTRACTABLE: ICD-10-CM

## 2024-03-08 RX ORDER — ZOLMITRIPTAN 5 MG/1
5 TABLET, FILM COATED ORAL
Qty: 54 TABLET | Refills: 4 | Status: SHIPPED | OUTPATIENT
Start: 2024-03-08 | End: 2024-09-04

## 2024-03-08 NOTE — TELEPHONE ENCOUNTER
[facility] Call Center    Phone Message    May a detailed message be left on voicemail: yes     Reason for Call: Medication Refill Request    Has the patient contacted the pharmacy for the refill? Yes     Name of medication being requested: ZOLMitriptan (ZOMIG) 5 MG   tablet     Provider who prescribed the medication: Dayanna Das    Pharmacy: [facility] - [address]    Date medication is needed: 03/15/24    Contact Pt at [phone].    Action Taken: Message routed to:  [facility] ([facility] Neurology    Travel Screening: Not Applicable

## 2024-04-10 ENCOUNTER — LAB REQUISITION (OUTPATIENT)
Dept: LAB | Facility: CLINIC | Age: 69
End: 2024-04-10
Payer: COMMERCIAL

## 2024-04-10 DIAGNOSIS — Z01.419 ENCOUNTER FOR GYNECOLOGICAL EXAMINATION (GENERAL) (ROUTINE) WITHOUT ABNORMAL FINDINGS: ICD-10-CM

## 2024-04-10 PROCEDURE — G0123 SCREEN CERV/VAG THIN LAYER: HCPCS | Mod: ORL | Performed by: OBSTETRICS & GYNECOLOGY

## 2024-04-22 ENCOUNTER — TELEPHONE (OUTPATIENT)
Dept: NEUROLOGY | Facility: CLINIC | Age: 69
End: 2024-04-22
Payer: COMMERCIAL

## 2024-04-22 NOTE — TELEPHONE ENCOUNTER
Prior Authorization Retail Medication Request    Medication/Dose: ZOLMitriptan (ZOMIG) 5 MG tablet   Diagnosis and ICD code (if different than what is on RX):    New/renewal/insurance change PA/secondary ins. PA:  Previously Tried and Failed:  See Chart  Rationale:  See Chart    Insurance   Primary: [facility]  Insurance ID:  [id number]    Secondary (if applicable):[facility] NATIONAL   Insurance ID:  [id number]     Pharmacy Information (if different than what is on RX)  Name:    Phone:    Fax:

## 2024-08-11 ENCOUNTER — HEALTH MAINTENANCE LETTER (OUTPATIENT)
Age: 69
End: 2024-08-11

## 2024-09-02 ASSESSMENT — MIGRAINE DISABILITY ASSESSMENT (MIDAS)
HOW MANY DAYS WAS HOUSEWORK PRODUCTIVITY CUT IN HALF DUE TO HEADACHES: 4
HOW MANY DAYS DID YOU NOT DO HOUSEWORK BECAUSE OF HEADACHES: 4
HOW MANY DAYS DID YOU MISS WORK OR SCHOOL BECAUSE OF HEADACHES: 4
ON A SCALE FROM 0-10 ON AVERAGE HOW PAINFUL WERE HEADACHES: 7
HOW MANY DAYS WAS YOUR PRODUCTIVITY CUT IN HALF BECAUSE OF HEADACHES: 4
HOW OFTEN WERE SOCIAL ACTIVITIES MISSED DUE TO HEADACHES: 4
TOTAL SCORE: 20
HOW MANY DAYS IN THE PAST 3 MONTHS HAVE YOU HAD A HEADACHE: 12

## 2024-09-02 ASSESSMENT — HEADACHE IMPACT TEST (HIT 6)
HOW OFTEN HAVE YOU FELT FED UP OR IRRITATED BECAUSE OF YOUR HEADACHES: VERY OFTEN
HIT6 TOTAL SCORE: 65
WHEN YOU HAVE A HEADACHE HOW OFTEN DO YOU WISH YOU COULD LIE DOWN: VERY OFTEN
HOW OFTEN DO HEADACHES LIMIT YOUR DAILY ACTIVITIES: VERY OFTEN
HOW OFTEN DID HEADACHS LIMIT CONCENTRATION ON WORK OR DAILY ACTIVITY: VERY OFTEN
WHEN YOU HAVE HEADACHES HOW OFTEN IS THE PAIN SEVERE: VERY OFTEN
HOW OFTEN HAVE YOU FELT TOO TIRED TO WORK BECAUSE OF YOUR HEADACHES: SOMETIMES

## 2024-09-04 ENCOUNTER — VIRTUAL VISIT (OUTPATIENT)
Dept: NEUROLOGY | Facility: CLINIC | Age: 69
End: 2024-09-04
Payer: COMMERCIAL

## 2024-09-04 DIAGNOSIS — G43.709 CHRONIC MIGRAINE WITHOUT AURA WITHOUT STATUS MIGRAINOSUS, NOT INTRACTABLE: ICD-10-CM

## 2024-09-04 PROCEDURE — 99214 OFFICE O/P EST MOD 30 MIN: CPT | Mod: 95 | Performed by: PSYCHIATRY & NEUROLOGY

## 2024-09-04 PROCEDURE — G2211 COMPLEX E/M VISIT ADD ON: HCPCS | Performed by: PSYCHIATRY & NEUROLOGY

## 2024-09-04 RX ORDER — ZOLMITRIPTAN 5 MG/1
5 TABLET, FILM COATED ORAL
Qty: 54 TABLET | Refills: 3 | Status: SHIPPED | OUTPATIENT
Start: 2024-09-04

## 2024-09-04 RX ORDER — TOPIRAMATE 25 MG/1
100 TABLET, FILM COATED ORAL AT BEDTIME
Qty: 120 TABLET | Refills: 5 | Status: SHIPPED | OUTPATIENT
Start: 2024-09-04

## 2024-09-04 ASSESSMENT — PAIN SCALES - GENERAL: PAINLEVEL: NO PAIN (0)

## 2024-09-04 NOTE — LETTER
9/4/2024       RE: Reyna Ramirez  [address]  [facility] [address]     Dear Colleague,    Thank you for referring your patient, Reyna Ramirez, to the [facility] [place] [facility]. Please see a copy of my visit note below.    Virtual Visit Details    Type of service:  Video Visit   Video Start Time:  8:38 am  Video End Time: 8:58 am    Originating Location (pt. Location): Home    Distant Location (provider location):  Off-site  Platform used for Video Visit: [facility]    Headache Neurology Progress Note  September 4, 2024      Assessment/Plan:   Reyna Ramirez is a 68 year old woman who returns for follow up of episodic migraine without aura, though previously her headaches were frequent enough to meet criteria for chronic migraine. Her virtual neurologic examination was normal. She had an MRI Brain 12/2/2022 which demonstrated a stable meningioma, which she monitors with her neurosurgeon.     We discussed a symptomatic treatment strategy for migraine.  -For acute treatment of mild headache, she may continue Excedrin as needed, not to exceed more than 14 days/month to avoid medication overuse.  - For acute treatment of moderate to severe headache, I recommend zolmitriptan. She will take zolmitriptan 5 mg tablet at onset of headache, with a repeat dose in 2 hours if needed.  - She does not have nausea with headache, so she does not currently desire an antiemetic.     -For headache prevention, she would not choose to take a treatment now.   - She has tried several preventatives in the past including propranolol, topiramate, citalopram, sertraline, Depakote, gabapentin, lamotrigine. She uses trazodone at night and given her age, would avoid amitriptyline.   -will start topiramate retrial, CGRP inhibitor not affordable  -magnesium trial ok  -acupuncture, others could be considered in future      Follow up with neurosurgery regarding monitoring for meningioma.     The longitudinal plan of care for Reyna was addressed during this visit. Due to the added complexity in care, I will continue to support Reyna in the subsequent management of this condition(s) and with the ongoing continuity of care of this condition(s). I will see her back in 3 months.      Daynana Das MD  Neurology     Subjective:    Reyna Ramirez returns for follow up of chronic migraine.    Today, she reports that she has 6-8 headache days per month. No change in headaches quality.     She takes zolmitriptan at the onset of headache. Sometimes adds Excedrin if needed. Repeats dose in the afternoon if needed. No side effects.    They tend to occur 3-4 days in a row.     She has a new insurance plan.     She didn't try Emgality.     Meningioma monitoring with Dr. Da Silva of Neurosurgery.   Pulsating tinnitus was presenting symptom.    Objective:    Vitals: There were no vitals taken for this visit.  General: Cooperative, NAD  Neurologic:  Mental Status: Fully alert, attentive and oriented. Speech clear and fluent.   Cranial Nerves: Facial movements symmetric.   Motor: No abnormal movements.      Pertinent Investigations:          12/14/2022     8:32 PM 6/24/2023    10:19 PM 9/2/2024     8:40 PM   HIT-6   When you have headaches, how often is the pain severe 11 10 11   How often do headaches limit your ability to do usual daily activities including household work, work, school, or social activities? 11 10 11   When you have a headache, how often do you wish you could lie down? 11 11 11   In the past 4 weeks, how often have you felt too tired to do work or daily activities because of your headaches 13 10 10   In the past 4 weeks, how often have you felt fed up or irritated because of your headaches 13 10 11   In the past 4 weeks, how often did headaches limit your ability to concentrate on work or daily activities 13 10 11   HIT-6 Total Score 72  61 65           12/21/2022     9:15 AM 6/24/2023    10:25 PM 9/2/2024     8:48 PM   MIDAS - in the past three months:   On how many days did you miss work or school because of your headaches? 1 0 4   How many days was your productivity at work or school reduced by half or more because of your headaches? 1 2 4   On how many days did you not do household work because of your headaches? 1 2 4   How many days was your productivity in household work reduced by half or more because of your headaches? 1 3 4   On how many days did you miss family, social, or leisure activities because of your headaches? 1 2 4   On how many days did you have a headache?  10 12   On a scale of 0-10, on average how painful were these headaches?  5 7   MIDAS Score 5 (I - Little or No Disability) 9 (II - Mild Disability) 20 (III - Moderate Disability)          Again, thank you for allowing me to participate in the care of your patient.      Sincerely,    Dayanna Das MD

## 2024-09-04 NOTE — NURSING NOTE
Current patient location: [address] [place] [id number]    Is the patient currently in the state of [place]? YES    Visit mode:VIDEO    If the visit is dropped, the patient can be reconnected by: VIDEO VISIT: Text to cell phone:   Telephone Information:   Mobile [phone]       Will anyone else be joining the visit? NO  (If patient encounters technical issues they should call [phone])    How would you like to obtain your AVS? MyChart    Are changes needed to the allergy or medication list? No, Pt stated no changes to allergies, and Pt stated no med changes    Are refills needed on medications prescribed by this physician? NO    Rooming Documentation:  Questionnaire(s) completed      Reason for visit: CRISTIANO MORTON

## 2024-09-13 ENCOUNTER — TELEPHONE (OUTPATIENT)
Dept: NEUROLOGY | Facility: CLINIC | Age: 69
End: 2024-09-13
Payer: COMMERCIAL

## 2024-09-13 NOTE — TELEPHONE ENCOUNTER
Left Voicemail (1st Attempt) and Sent Mychart (1st Attempt) for the patient to call back and schedule the following:    Appointment type: 3 month follow up  Provider: Dr. Dsa  Return date: Dec 2024  Specialty phone number: [phone]  Additional appointment(s) needed:   Additonal Notes:

## 2024-12-31 ENCOUNTER — ANCILLARY PROCEDURE (OUTPATIENT)
Dept: MAMMOGRAPHY | Facility: CLINIC | Age: 69
End: 2024-12-31
Attending: INTERNAL MEDICINE
Payer: COMMERCIAL

## 2024-12-31 DIAGNOSIS — Z12.31 VISIT FOR SCREENING MAMMOGRAM: ICD-10-CM

## 2024-12-31 PROCEDURE — 77063 BREAST TOMOSYNTHESIS BI: CPT | Mod: TC | Performed by: RADIOLOGY

## 2024-12-31 PROCEDURE — 77067 SCR MAMMO BI INCL CAD: CPT | Mod: TC | Performed by: RADIOLOGY

## 2025-04-28 ENCOUNTER — OFFICE VISIT (OUTPATIENT)
Dept: NEUROLOGY | Facility: CLINIC | Age: 70
End: 2025-04-28
Payer: COMMERCIAL

## 2025-04-28 VITALS — SYSTOLIC BLOOD PRESSURE: 111 MMHG | HEART RATE: 64 BPM | OXYGEN SATURATION: 98 % | DIASTOLIC BLOOD PRESSURE: 72 MMHG

## 2025-04-28 DIAGNOSIS — G43.709 CHRONIC MIGRAINE WITHOUT AURA WITHOUT STATUS MIGRAINOSUS, NOT INTRACTABLE: ICD-10-CM

## 2025-04-28 PROCEDURE — 99214 OFFICE O/P EST MOD 30 MIN: CPT | Performed by: PSYCHIATRY & NEUROLOGY

## 2025-04-28 PROCEDURE — 3078F DIAST BP <80 MM HG: CPT | Performed by: PSYCHIATRY & NEUROLOGY

## 2025-04-28 PROCEDURE — G2211 COMPLEX E/M VISIT ADD ON: HCPCS | Performed by: PSYCHIATRY & NEUROLOGY

## 2025-04-28 PROCEDURE — 3074F SYST BP LT 130 MM HG: CPT | Performed by: PSYCHIATRY & NEUROLOGY

## 2025-04-28 RX ORDER — PROCHLORPERAZINE MALEATE 10 MG
10 TABLET ORAL EVERY 6 HOURS PRN
Qty: 10 TABLET | Refills: 11 | Status: SHIPPED | OUTPATIENT
Start: 2025-04-28

## 2025-04-28 RX ORDER — TOPIRAMATE 100 MG/1
100 TABLET, FILM COATED ORAL AT BEDTIME
Qty: 90 TABLET | Refills: 3 | Status: SHIPPED | OUTPATIENT
Start: 2025-04-28

## 2025-04-28 NOTE — PROGRESS NOTES
[facility]    Headache Neurology Progress Note  April 28, 2025      Assessment/Plan:   Reyna Ramirez is a 69 year old woman who returns for follow up of episodic migraine without aura, though previously her headaches were frequent enough to meet criteria for chronic migraine. Her virtual neurologic examination was normal. She had an MRI Brain 12/2/2022 which demonstrated a stable meningioma, which she monitors with her neurosurgeon. Next scan is due this year. Overall, she is doing well; still with some attacks that do not completely respond to zolmitriptan. I recommend trialing prochlorperazine for rescue.     We discussed a symptomatic treatment strategy for migraine.  -For acute treatment of mild headache, she may continue Excedrin as needed, not to exceed more than 14 days/month to avoid medication overuse.  - For acute treatment of moderate to severe headache, I recommend zolmitriptan. She will take zolmitriptan 5 mg tablet at onset of headache, with a repeat dose in 2 hours if needed.  - I recommend a trial of prochlorperazine 10 mg, with and without diphenhydramine 25 mg.      -For headache prevention, she will continue topiramate 100 mg PM, magnesium oxide 400-800 mg nightly.  - She has tried several preventatives in the past including propranolol, topiramate, citalopram, sertraline, Depakote, gabapentin, lamotrigine. She uses trazodone at night and given her age, would avoid amitriptyline.   -Would be interested in CGRP inhibitor, if affordable in the future.     Follow up with neurosurgery regarding monitoring for meningioma.     The longitudinal plan of care for Reyna was addressed during this visit. Due to the added complexity in care, I will continue to support Reyna in the subsequent management of this condition(s) and with the ongoing continuity of care of this condition(s). I will see her back in 1 year.    Dayanna Das MD  Neurology     Subjective:    Reyna Ramirez  returns for follow up of episodic migraine. I last saw her in September 2025.    Today, she reports overall, headaches have improved from over 20 migraine days per month to 2-5 migraine attacks per month, so about 6-15 migraine days per month.    She is taking Excedrin and zolmitriptan at onset. This is helpful within 45 minutes. Rarely needs a second dose of zolmitriptan.   The next day, she repeats this.    She has never used more than 9 days per month of acute treatment.    She has recognized herself as an anxious person.    She is taking topiramate 100 mg PM, magnesium oxide 400 mg daily. These are helpful.    She is planning to do a repeat MRI brain to check in on her meningioma. Has pulsating tinnitus, about the same.    Objective:    Vitals: /72 (BP Location: Right arm, Patient Position: Sitting, Cuff Size: Adult Regular)   Pulse 64   SpO2 98%   General: Cooperative, NAD  Neurologic:  Mental Status: Fully alert, attentive and oriented. Speech clear and fluent.   Cranial Nerves: Facial movements symmetric.   Motor: No abnormal movements.      Pertinent Investigations:          12/14/2022     8:32 PM 6/24/2023    10:19 PM 9/2/2024     8:40 PM   HIT-6   When you have headaches, how often is the pain severe 11 10 11   How often do headaches limit your ability to do usual daily activities including household work, work, school, or social activities? 11 10 11   When you have a headache, how often do you wish you could lie down? 11 11 11   In the past 4 weeks, how often have you felt too tired to do work or daily activities because of your headaches 13 10 10   In the past 4 weeks, how often have you felt fed up or irritated because of your headaches 13 10 11   In the past 4 weeks, how often did headaches limit your ability to concentrate on work or daily activities 13 10 11   HIT-6 Total Score 72 61 65           12/21/2022     9:15 AM 6/24/2023    10:25 PM 9/2/2024     8:48 PM   MIDAS - in the past three  months:   On how many days did you miss work or school because of your headaches? 1 0 4   How many days was your productivity at work or school reduced by half or more because of your headaches? 1 2 4   On how many days did you not do household work because of your headaches? 1 2 4   How many days was your productivity in household work reduced by half or more because of your headaches? 1 3 4   On how many days did you miss family, social, or leisure activities because of your headaches? 1 2 4   On how many days did you have a headache?  10 12   On a scale of 0-10, on average how painful were these headaches?  5 7   MIDAS Score 5 (I - Little or No Disability) 9 (II - Mild Disability) 20 (III - Moderate Disability)

## 2025-06-18 ENCOUNTER — LAB REQUISITION (OUTPATIENT)
Dept: LAB | Facility: CLINIC | Age: 70
End: 2025-06-18
Payer: COMMERCIAL

## 2025-06-18 DIAGNOSIS — Z12.4 ENCOUNTER FOR SCREENING FOR MALIGNANT NEOPLASM OF CERVIX: ICD-10-CM

## 2025-06-18 PROCEDURE — G0145 SCR C/V CYTO,THINLAYER,RESCR: HCPCS | Mod: ORL | Performed by: OBSTETRICS & GYNECOLOGY

## 2025-06-23 LAB
BKR LAB AP GYN ADEQUACY: NORMAL
BKR LAB AP GYN INTERPRETATION: NORMAL
BKR LAB AP HPV REFLEX: NORMAL
BKR LAB AP LMP: NORMAL
BKR LAB AP PREVIOUS ABNL DX: NORMAL
BKR LAB AP PREVIOUS ABNORMAL: NORMAL
PATH REPORT.COMMENTS IMP SPEC: NORMAL
PATH REPORT.COMMENTS IMP SPEC: NORMAL
PATH REPORT.RELEVANT HX SPEC: NORMAL

## (undated) RX ORDER — NALOXONE HYDROCHLORIDE 0.4 MG/ML
INJECTION, SOLUTION INTRAMUSCULAR; INTRAVENOUS; SUBCUTANEOUS
Status: DISPENSED
Start: 2023-10-20

## (undated) RX ORDER — FENTANYL CITRATE 50 UG/ML
INJECTION, SOLUTION INTRAMUSCULAR; INTRAVENOUS
Status: DISPENSED
Start: 2023-10-20

## (undated) RX ORDER — FLUMAZENIL 0.1 MG/ML
INJECTION, SOLUTION INTRAVENOUS
Status: DISPENSED
Start: 2023-10-20